# Patient Record
(demographics unavailable — no encounter records)

---

## 2024-10-30 NOTE — ASSESSMENT
[FreeTextEntry1] : 14-year-old male of  ancestry with pectus excavatum without family history of similarly affected family members or known connective tissue disorders. On physical examination there is mild pectus excavatum with pes planus, mild brachycephaly and flat occiput, normal stature and no significant dysmorphic features.  Beighton score is 1 and [place] score only 2.  Echocardiography today reportedly normal without aortic root dilatation or mitral valve abnormality.  This presentation seems most consistent with isolated pectus excavatum with low suspicion for syndromic genetic cause.  The genetic basis of isolated pectus deformity is not well defined and genetic testing not currently indicated.    We recommend continued follow-up with Pediatric surgery and can follow-up with genetics if new concerns arise.

## 2024-10-30 NOTE — ASSESSMENT
[FreeTextEntry1] : 14-year-old male of  ancestry with pectus excavatum without family history of similarly affected family members or known connective tissue disorders. On physical examination there is mild pectus excavatum with pes planus, mild brachycephaly and flat occiput, normal stature and no significant dysmorphic features.  Beighton score is 1 and [place] score only 2.  Echocardiography today reportedly normal without aortic root dilatation or mitral valve abnormality.  This presentation seems most consistent with isolated pectus excavatum with low suspicion for syndromic genetic cause.  The genetic basis of isolated pectus deformity is not well defined and genetic testing not currently indicated.    We recommend continued follow-up with Pediatric surgery and can follow-up with genetics if new concerns arise.

## 2024-10-30 NOTE — ASSESSMENT
[FreeTextEntry1] : 14-year-old male of  ancestry with pectus excavatum without family history of similarly affected family members or known connective tissue disorders. On physical examination there is mild pectus excavatum with pes planus, mild brachycephaly and flat occiput, normal stature and no significant dysmorphic features.  Beighton score is 1 and [place] score only 2.  Echocardiography today reportedly normal without aortic root dilatation or mitral valve abnormality.  This presentation seems most consistent with isolated pectus excavatum with low suspicion for syndromic genetic cause.  The genetic basis of isolated pectus deformity is not well defined and genetic testing not currently indicated.    We recommend continued follow-up with Pediatric surgery and can follow-up with genetics if new concerns arise.

## 2024-10-30 NOTE — BIRTH HISTORY
[FreeTextEntry1] : Abdulkadir was the 11-pound product of a Ft gestation, born by  to a  mother.  The pregnancy and birth histories are unremarkable.  Abdulkadir did well in the  period and went home with his mother at 2 days of age.

## 2024-11-01 NOTE — HISTORY OF PRESENT ILLNESS
[de-identified] : Abdulkadir is 14-year-old male with pectus excavatum.  He was seen by Dr. Renner in Sept 2024 a was noted to have a moderate pectus excavatum, asymmetric to the right.  He will be followed as he grows.  Abdulkadir does not have scoliosis, flat feet, dental crowding or myopia.  He has never been seen by an ophthalmologist but his vision screens have all been normal.  Abdulkadir is having no difficulty in school and is an active runner, recently completing a half marathon without history of musculoskeletal injury or discomfort. [FreeTextEntry1] : Abdulkadir was evaluated at the combined connective tissue disorder center at the [facility] on October 30, 2024.  He is now a 14-year-old youngster who was referred to rule out the possibility of Marfan or a related syndrome because of a pectus excavatum chest wall deformity.  He was accompanied to the office visit today by his father.  Abdulkadir is asymptomatic with reference to the cardiovascular system.  He reports no chest pain, palpitations, dizziness, easy fatigability, shortness of breath, or syncope.  He is an active youngster who enjoys participating in track, basketball, soccer and baseball and does so without any difficulties.  He was evaluated by Dr. Renner of general surgery on September 23, 2024.  He was found to have a moderate pectus excavatum, asymmetric to the right side.  Expectant follow-up was recommended.  He has no other known orthopedic problems.  Abdulkadir has no known visual problems.  He has never had a formal ophthalmology evaluation.  He has no history of hernias.  He has had no previous surgical procedures.  He is on no chronic medications.  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  His immunizations are up to date.  His past medical history is notable for transient erythroblastopenia of childhood which occurred at age 2 years, and which has resolved.  He is presently in the eighth grade and academically does well.  There is no known family history of Marfan or a Marfan related syndrome. There is no family history of cardiac surgery, aortic aneurysms/dissections or sudden unexplained death.  The family history is negative for significant heart or visual problems, scoliosis, chest wall deformities, or other features suggestive of Marfan or a related syndrome.  Abdulkadir's paternal grandmother has mitral valve prolapse.  He has a 10-year-old brother who is in good health.

## 2024-11-01 NOTE — HISTORY OF PRESENT ILLNESS
[de-identified] : Abdulkadir is 14-year-old male with pectus excavatum.  He was seen by Dr. Renner in Sept 2024 a was noted to have a moderate pectus excavatum, asymmetric to the right.  He will be followed as he grows.  Abdulkadir does not have scoliosis, flat feet, dental crowding or myopia.  He has never been seen by an ophthalmologist but his vision screens have all been normal.  Abdulkadir is having no difficulty in school and is an active runner, recently completing a half marathon without history of musculoskeletal injury or discomfort. [FreeTextEntry1] : Abdulkadir was evaluated at the combined connective tissue disorder center at the [facility] on October 30, 2024.  He is now a 14-year-old youngster who was referred to rule out the possibility of Marfan or a related syndrome because of a pectus excavatum chest wall deformity.  He was accompanied to the office visit today by his father.  Abdulkadir is asymptomatic with reference to the cardiovascular system.  He reports no chest pain, palpitations, dizziness, easy fatigability, shortness of breath, or syncope.  He is an active youngster who enjoys participating in track, basketball, soccer and baseball and does so without any difficulties.  He was evaluated by Dr. Renner of general surgery on September 23, 2024.  He was found to have a moderate pectus excavatum, asymmetric to the right side.  Expectant follow-up was recommended.  He has no other known orthopedic problems.  Abdulkadir has no known visual problems.  He has never had a formal ophthalmology evaluation.  He has no history of hernias.  He has had no previous surgical procedures.  He is on no chronic medications.  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  His immunizations are up to date.  His past medical history is notable for transient erythroblastopenia of childhood which occurred at age 2 years, and which has resolved.  He is presently in the eighth grade and academically does well.  There is no known family history of Marfan or a Marfan related syndrome. There is no family history of cardiac surgery, aortic aneurysms/dissections or sudden unexplained death.  The family history is negative for significant heart or visual problems, scoliosis, chest wall deformities, or other features suggestive of Marfan or a related syndrome.  Abdulkadir's paternal grandmother has mitral valve prolapse.  He has a 10-year-old brother who is in good health.

## 2024-11-01 NOTE — PHYSICAL EXAM
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, brachycephalic with flat occiput (since infancy) [de-identified] : mild malar flattening and retrognathia [de-identified] : mild pectus with asymmetry [de-identified] : mild pes planus with ankle pronation [de-identified] : normal skin texture and extensibility, no striae or abnormal scarring [FreeTextEntry1] : 2 [FreeTextEntry5] : 84.5 [FreeTextEntry6] : 73 [TWNoteComboBox1] : 0 [FreeTextEntry7] : 0.86 [TWNoteComboBox2] : 0 [TWNoteComboBox3] : 0 [TWNoteComboBox4] : 1 [TWNoteComboBox5] : 0 [TWNoteComboBox6] : 1 [TWNoteComboBox7] : 0 [de-identified] : 0 [de-identified] : 0 [de-identified] : 0 [de-identified] : 0 [de-identified] : 0 [de-identified] : 0 [de-identified] : 0 [Right] : Right: N [Left] : Left: N [General Appearance - Alert] : alert [General Appearance - In No Acute Distress] : in no acute distress [General Appearance - Well Developed] : well developed [General Appearance - Well-Appearing] : well appearing [Attitude Uncooperative] : cooperative [Sclera] : the conjunctiva were normal [Outer Ear] : the ears and nose were normal in appearance [Oropharynx] : the oropharynx was normal [Examination Of The Oral Cavity] : mucous membranes were moist and pink [Normal Uvula] : a normal uvula [No Cough] : no cough [Auscultation Breath Sounds / Voice Sounds] : breath sounds clear to auscultation bilaterally [Respiration, Rhythm And Depth] : normal respiratory rhythm and effort [Pectus Excavatum] : a pectus excavatum was noted [Apical Impulse] : quiet precordium with normal apical impulse [Heart Rate And Rhythm] : normal heart rate and rhythm [Heart Sounds] : normal S1 and S2 [No Murmur] : no murmurs  [Heart Sounds Gallop] : no gallops [Heart Sounds Pericardial Friction Rub] : no pericardial rub [Edema] : no edema [Arterial Pulses] : normal upper and lower extremity pulses with no pulse delay [Heart Sounds Click] : no clicks [Capillary Refill Test] : normal capillary refill [Cath Insert Site] : the catheterization site was clean, dry and nonerythematous [Abdomen Soft] : soft [Abdomen Tenderness] : non-tender [Nail Clubbing] : no clubbing  or cyanosis of the fingernails [No] : No [Abnormal Walk] : normal gait [Skin Lesions] : no lesions [Demonstrated Behavior - Infant Nonreactive To Parents] : interactive [Mood] : mood and affect were appropriate for age [Demonstrated Behavior] : normal behavior [Mild] : mild [High-Arched Palate] : no high arch [Bilateral] : bilateral negative [] : No [FreeTextEntry4] : Orthodontic braces [FreeTextEntry3] : Rightward aspect of the anterior chest wall was more prominent than the left [de-identified] : 1.04 [FreeTextEntry9] : 0.86 [FreeTextEntry2] : No mitral valve prolapse No myopia No striae No pneumothoraces No dolichostenomelia  Systemic [place] score of 2 (7 or greater being significant)  Beighton scale: Total score of > or = 5/9 defines hypermobility: Total Score = 1  The above connective tissue assessment was performed and recorded by me and Dr. Neto Leach, medical geneticist.

## 2024-11-01 NOTE — CARDIOLOGY SUMMARY
[Today's Date] : [unfilled] [LVSF ___%] : LV Shortening Fraction [unfilled]% [Normal] : normal [FreeTextEntry1] : The electrocardiogram today revealed a normal sinus rhythm at a rate of 56 bpm, with a normal axis and normal ventricular forces.  There was a right ventricular conduction delay.  The measured intervals were normal. There was no ectopy seen on the surface electrocardiogram. [FreeTextEntry2] : Summary:  1.  {S,D,S  \} Situs solitus, D-ventricular looping, normally related great arteries. 2. Normal mitral valve morphology and inflow Doppler profile. 3. Normal aortic root. 4. Aortic sinuses of Valsalva dimension (systole) = 2.69 cm (z = 0.55). 5. The aortic root in cross section (PSAX) measures:2.68 cm X 2.7 cm X 2.7 cm. The ascending aorta in cross section (PSAX) at the level of the right pulmonary artery measures: 2.51 cm. The proximal abdominal aorta and the thoracic descending aorta appear normal in caliber and uniform in contour. 6. Normal ascending aorta. 7. Ascending aorta dimension (systole) = 2.5 cm (z = 0.95). 8. Normal left ventricular size, morphology and systolic function. 9. Left ventricular ejection fraction by 5/6 Area x Length is normal at 65 %. 10. Normal left ventricular diastolic function. 11. Normal right ventricular morphology with qualitatively normal size and systolic function. 12. No pericardial effusion.

## 2024-11-01 NOTE — PHYSICAL EXAM
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, brachycephalic with flat occiput (since infancy) [de-identified] : mild malar flattening and retrognathia [de-identified] : mild pectus with asymmetry [de-identified] : mild pes planus with ankle pronation [de-identified] : normal skin texture and extensibility, no striae or abnormal scarring [FreeTextEntry1] : 2 [FreeTextEntry5] : 84.5 [FreeTextEntry6] : 73 [FreeTextEntry7] : 0.86 [TWNoteComboBox1] : 0 [TWNoteComboBox2] : 0 [TWNoteComboBox3] : 0 [TWNoteComboBox4] : 1 [TWNoteComboBox5] : 0 [TWNoteComboBox6] : 1 [TWNoteComboBox7] : 0 [de-identified] : 0 [de-identified] : 0 [de-identified] : 0 [de-identified] : 0 [de-identified] : 0 [de-identified] : 0 [de-identified] : 0 [Right] : Right: N [Left] : Left: N [General Appearance - Alert] : alert [General Appearance - In No Acute Distress] : in no acute distress [General Appearance - Well Developed] : well developed [General Appearance - Well-Appearing] : well appearing [Attitude Uncooperative] : cooperative [Sclera] : the conjunctiva were normal [Outer Ear] : the ears and nose were normal in appearance [Examination Of The Oral Cavity] : mucous membranes were moist and pink [Oropharynx] : the oropharynx was normal [Normal Uvula] : a normal uvula [No Cough] : no cough [Auscultation Breath Sounds / Voice Sounds] : breath sounds clear to auscultation bilaterally [Respiration, Rhythm And Depth] : normal respiratory rhythm and effort [Pectus Excavatum] : a pectus excavatum was noted [Apical Impulse] : quiet precordium with normal apical impulse [Heart Rate And Rhythm] : normal heart rate and rhythm [Heart Sounds] : normal S1 and S2 [No Murmur] : no murmurs  [Heart Sounds Gallop] : no gallops [Heart Sounds Pericardial Friction Rub] : no pericardial rub [Arterial Pulses] : normal upper and lower extremity pulses with no pulse delay [Edema] : no edema [Heart Sounds Click] : no clicks [Capillary Refill Test] : normal capillary refill [Cath Insert Site] : the catheterization site was clean, dry and nonerythematous [Abdomen Soft] : soft [Abdomen Tenderness] : non-tender [Nail Clubbing] : no clubbing  or cyanosis of the fingernails [No] : No [Abnormal Walk] : normal gait [Skin Lesions] : no lesions [Demonstrated Behavior - Infant Nonreactive To Parents] : interactive [Mood] : mood and affect were appropriate for age [Demonstrated Behavior] : normal behavior [Mild] : mild [High-Arched Palate] : no high arch [Bilateral] : bilateral negative [] : No [FreeTextEntry4] : Orthodontic braces [FreeTextEntry3] : Rightward aspect of the anterior chest wall was more prominent than the left [de-identified] : 1.04 [FreeTextEntry9] : 0.86 [FreeTextEntry2] : No mitral valve prolapse No myopia No striae No pneumothoraces No dolichostenomelia  Systemic [place] score of 2 (7 or greater being significant)  Beighton scale: Total score of > or = 5/9 defines hypermobility: Total Score = 1  The above connective tissue assessment was performed and recorded by me and Dr. Neto Leach, medical geneticist.

## 2024-11-01 NOTE — DISCUSSION/SUMMARY
[PE + No Restrictions] : [unfilled] may participate in the entire physical education program without restriction, including all varsity competitive sports. [Influenza vaccine is recommended] : Influenza vaccine is recommended [FreeTextEntry1] : In summary, Abdulkadir has had a normal cardiac evaluation. He has no evidence of mitral valve prolapse and his aortic dimensions are within normal limits. He has no evidence of pulmonary hypertension. He was normotensive.  He was in a normal sinus rhythm on his electrocardiogram.  There is no known family history of Marfan or a Marfan related syndrome.        Abdulkadir has no known visual problems; however, for completeness we have recommended a formal ophthalmology evaluation to rule out the unlikely possibility of ectopia lentis. I have requested that the results of his ophthalmology evaluation be forwarded to me for my review.  The [place] score of systemic features associated with potential Marfan syndrome in Abdulkadir was only 2 (7 or greater being significant). Contributing to this score was his  pectus excavatum - 1 and pes planus - 1.  In the absence of a positive family history for Marfan syndrome, in the absence of aortic dilation, and in the likely absence of ectopia lentis, coupled with a low systemic [place] score, Abdulkadir does not meet criteria for a clinical diagnosis of Marfan or a Marfan related syndrome.   After discussion with Dr. Leach, of genetics, we concluded that Abdulkadir likely has an isolated, nonsyndromic, pectus excavatum.  Chest wall abnormalities may be inherited in an autosomal dominant pattern.  There is no known gene associated with isolated chest wall deformities at this time.  There is no longer a need for follow-up in pediatric cardiology unless clinically indicated, or unless he is found to have ectopia lentis. He should of course continue to follow with Dr. Renner for his chest wall abnormality.  With the use of diagrams, the above information was explained at length to Abdulkadir and his father, and all of their questions were answered to the best of my abilities.  We of course remain available for further consultation in the future should additional clinical concerns arise.  I hope you find this information helpful to you.   Total time spent today included reviewing diagnosis and treatment plan/monitoring, review of prior notes, review of medications and monitoring for side effects, review of last labs with patient/family, plan for continued symptom and laboratory monitoring as ordered, and time spent with patient/parent, and with my genetics colleagues re: this patient.  Reviewed recent chart notes from other providers and medical records as well. This excludes time spent on procedures. [Needs SBE Prophylaxis] : [unfilled] does not need bacterial endocarditis prophylaxis

## 2024-11-01 NOTE — CONSULT LETTER
[Today's Date] : [unfilled] [Name] : Name: [unfilled] [] : : ~~ [Today's Date:] : [unfilled] [Dear  ___:] : Dear Dr. [unfilled]: [Consult] : I had the pleasure of evaluating your patient, [unfilled]. My full evaluation follows. [Consult - Single Provider] : Thank you very much for allowing me to participate in the care of this patient. If you have any questions, please do not hesitate to contact me. [Sincerely,] : Sincerely, [DrKait  ___] : Dr. PIKE [FreeTextEntry4] : Ami Mason MD [FreeTextEntry5] : [address] [id number] [de-identified] : Kymberly Collier MD Pediatric Cardiologist [facility], [address], [place] [address] Phone: [phone] FAX: [phone]

## 2024-11-01 NOTE — REASON FOR VISIT
[Initial Consultation] : an initial consultation for [Patient] : patient [Father] : father [FreeTextEntry3] : chest wall deformity

## 2024-11-01 NOTE — CONSULT LETTER
[Today's Date] : [unfilled] [Name] : Name: [unfilled] [] : : ~~ [Today's Date:] : [unfilled] [Dear  ___:] : Dear Dr. [unfilled]: [Consult] : I had the pleasure of evaluating your patient, [unfilled]. My full evaluation follows. [Consult - Single Provider] : Thank you very much for allowing me to participate in the care of this patient. If you have any questions, please do not hesitate to contact me. [Sincerely,] : Sincerely, [DrKait  ___] : Dr. PIKE [FreeTextEntry4] : Ami Mason MD [FreeTextEntry5] : [address] [id number] [de-identified] : Kymberly Collier MD Pediatric Cardiologist [facility], [address], [place] [address] Phone: [phone] FAX: [phone]

## 2024-11-01 NOTE — HISTORY OF PRESENT ILLNESS
[de-identified] : Abdulkadir is 14-year-old male with pectus excavatum.  He was seen by Dr. Renner in Sept 2024 a was noted to have a moderate pectus excavatum, asymmetric to the right.  He will be followed as he grows.  Abdulkadir does not have scoliosis, flat feet, dental crowding or myopia.  He has never been seen by an ophthalmologist but his vision screens have all been normal.  Abdulkadir is having no difficulty in school and is an active runner, recently completing a half marathon without history of musculoskeletal injury or discomfort. [FreeTextEntry1] : Abdulkadir was evaluated at the combined connective tissue disorder center at the [facility] on October 30, 2024.  He is now a 14-year-old youngster who was referred to rule out the possibility of Marfan or a related syndrome because of a pectus excavatum chest wall deformity.  He was accompanied to the office visit today by his father.  Abdulkadir is asymptomatic with reference to the cardiovascular system.  He reports no chest pain, palpitations, dizziness, easy fatigability, shortness of breath, or syncope.  He is an active youngster who enjoys participating in track, basketball, soccer and baseball and does so without any difficulties.  He was evaluated by Dr. Renner of general surgery on September 23, 2024.  He was found to have a moderate pectus excavatum, asymmetric to the right side.  Expectant follow-up was recommended.  He has no other known orthopedic problems.  Abdulkadir has no known visual problems.  He has never had a formal ophthalmology evaluation.  He has no history of hernias.  He has had no previous surgical procedures.  He is on no chronic medications.  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  His immunizations are up to date.  His past medical history is notable for transient erythroblastopenia of childhood which occurred at age 2 years, and which has resolved.  He is presently in the eighth grade and academically does well.  There is no known family history of Marfan or a Marfan related syndrome. There is no family history of cardiac surgery, aortic aneurysms/dissections or sudden unexplained death.  The family history is negative for significant heart or visual problems, scoliosis, chest wall deformities, or other features suggestive of Marfan or a related syndrome.  Abdulkadir's paternal grandmother has mitral valve prolapse.  He has a 10-year-old brother who is in good health.

## 2024-11-01 NOTE — PHYSICAL EXAM
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, brachycephalic with flat occiput (since infancy) [de-identified] : mild malar flattening and retrognathia [de-identified] : mild pectus with asymmetry [de-identified] : mild pes planus with ankle pronation [de-identified] : normal skin texture and extensibility, no striae or abnormal scarring [FreeTextEntry1] : 2 [FreeTextEntry5] : 84.5 [FreeTextEntry6] : 73 [TWNoteComboBox1] : 0 [FreeTextEntry7] : 0.86 [TWNoteComboBox2] : 0 [TWNoteComboBox3] : 0 [TWNoteComboBox4] : 1 [TWNoteComboBox5] : 0 [TWNoteComboBox6] : 1 [TWNoteComboBox7] : 0 [de-identified] : 0 [de-identified] : 0 [de-identified] : 0 [de-identified] : 0 [de-identified] : 0 [de-identified] : 0 [de-identified] : 0 [Right] : Right: N [Left] : Left: N [General Appearance - Alert] : alert [General Appearance - In No Acute Distress] : in no acute distress [General Appearance - Well Developed] : well developed [General Appearance - Well-Appearing] : well appearing [Attitude Uncooperative] : cooperative [Sclera] : the conjunctiva were normal [Outer Ear] : the ears and nose were normal in appearance [Oropharynx] : the oropharynx was normal [Examination Of The Oral Cavity] : mucous membranes were moist and pink [Normal Uvula] : a normal uvula [No Cough] : no cough [Auscultation Breath Sounds / Voice Sounds] : breath sounds clear to auscultation bilaterally [Respiration, Rhythm And Depth] : normal respiratory rhythm and effort [Pectus Excavatum] : a pectus excavatum was noted [Apical Impulse] : quiet precordium with normal apical impulse [Heart Rate And Rhythm] : normal heart rate and rhythm [Heart Sounds] : normal S1 and S2 [No Murmur] : no murmurs  [Heart Sounds Gallop] : no gallops [Heart Sounds Pericardial Friction Rub] : no pericardial rub [Edema] : no edema [Arterial Pulses] : normal upper and lower extremity pulses with no pulse delay [Heart Sounds Click] : no clicks [Capillary Refill Test] : normal capillary refill [Cath Insert Site] : the catheterization site was clean, dry and nonerythematous [Abdomen Soft] : soft [Abdomen Tenderness] : non-tender [Nail Clubbing] : no clubbing  or cyanosis of the fingernails [No] : No [Abnormal Walk] : normal gait [Skin Lesions] : no lesions [Demonstrated Behavior - Infant Nonreactive To Parents] : interactive [Mood] : mood and affect were appropriate for age [Demonstrated Behavior] : normal behavior [Mild] : mild [High-Arched Palate] : no high arch [Bilateral] : bilateral negative [] : No [FreeTextEntry4] : Orthodontic braces [FreeTextEntry3] : Rightward aspect of the anterior chest wall was more prominent than the left [de-identified] : 1.04 [FreeTextEntry9] : 0.86 [FreeTextEntry2] : No mitral valve prolapse No myopia No striae No pneumothoraces No dolichostenomelia  Systemic Dundee score of 2 (7 or greater being significant)  Beighton scale: Total score of > or = 5/9 defines hypermobility: Total Score = 1  The above connective tissue assessment was performed and recorded by me and Dr. Neto Leach, medical geneticist.